# Patient Record
Sex: MALE | URBAN - METROPOLITAN AREA
[De-identification: names, ages, dates, MRNs, and addresses within clinical notes are randomized per-mention and may not be internally consistent; named-entity substitution may affect disease eponyms.]

---

## 2018-11-27 ENCOUNTER — APPOINTMENT (OUTPATIENT)
Dept: RADIOLOGY | Facility: IMAGING CENTER | Age: 33
End: 2018-11-27
Attending: PHYSICIAN ASSISTANT
Payer: COMMERCIAL

## 2018-11-27 ENCOUNTER — OFFICE VISIT (OUTPATIENT)
Dept: URGENT CARE | Facility: PHYSICIAN GROUP | Age: 33
End: 2018-11-27
Payer: COMMERCIAL

## 2018-11-27 VITALS
BODY MASS INDEX: 31.04 KG/M2 | OXYGEN SATURATION: 98 % | SYSTOLIC BLOOD PRESSURE: 132 MMHG | DIASTOLIC BLOOD PRESSURE: 76 MMHG | HEART RATE: 76 BPM | TEMPERATURE: 98 F | HEIGHT: 69 IN | RESPIRATION RATE: 16 BRPM | WEIGHT: 209.6 LBS

## 2018-11-27 DIAGNOSIS — S60.222A CONTUSION OF LEFT HAND, INITIAL ENCOUNTER: ICD-10-CM

## 2018-11-27 PROCEDURE — 99214 OFFICE O/P EST MOD 30 MIN: CPT | Performed by: PHYSICIAN ASSISTANT

## 2018-11-27 PROCEDURE — 73130 X-RAY EXAM OF HAND: CPT | Mod: 26,LT | Performed by: PHYSICIAN ASSISTANT

## 2018-11-27 RX ORDER — IBUPROFEN 800 MG/1
800 TABLET ORAL EVERY 8 HOURS PRN
Qty: 30 TAB | Refills: 0 | Status: SHIPPED | OUTPATIENT
Start: 2018-11-27 | End: 2022-01-26
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
## 2021-04-05 ENCOUNTER — APPOINTMENT (OUTPATIENT)
Dept: RADIOLOGY | Facility: IMAGING CENTER | Age: 36
End: 2021-04-05
Attending: PHYSICIAN ASSISTANT
Payer: COMMERCIAL

## 2021-04-05 ENCOUNTER — OFFICE VISIT (OUTPATIENT)
Dept: URGENT CARE | Facility: PHYSICIAN GROUP | Age: 36
End: 2021-04-05
Payer: COMMERCIAL

## 2021-04-05 ENCOUNTER — APPOINTMENT (OUTPATIENT)
Dept: URGENT CARE | Facility: PHYSICIAN GROUP | Age: 36
End: 2021-04-05
Payer: COMMERCIAL

## 2021-04-05 VITALS
WEIGHT: 220 LBS | OXYGEN SATURATION: 98 % | BODY MASS INDEX: 29.8 KG/M2 | TEMPERATURE: 99.5 F | SYSTOLIC BLOOD PRESSURE: 116 MMHG | RESPIRATION RATE: 12 BRPM | HEIGHT: 72 IN | HEART RATE: 72 BPM | DIASTOLIC BLOOD PRESSURE: 62 MMHG

## 2021-04-05 DIAGNOSIS — S91.031A PUNCTURE WOUND OF RIGHT ANKLE, INITIAL ENCOUNTER: ICD-10-CM

## 2021-04-05 PROCEDURE — 90471 IMMUNIZATION ADMIN: CPT | Performed by: PHYSICIAN ASSISTANT

## 2021-04-05 PROCEDURE — 90715 TDAP VACCINE 7 YRS/> IM: CPT | Performed by: PHYSICIAN ASSISTANT

## 2021-04-05 PROCEDURE — 99214 OFFICE O/P EST MOD 30 MIN: CPT | Mod: 25 | Performed by: PHYSICIAN ASSISTANT

## 2021-04-05 RX ORDER — CEPHALEXIN 500 MG/1
500 CAPSULE ORAL 3 TIMES DAILY
Qty: 30 CAPSULE | Refills: 0 | Status: SHIPPED | OUTPATIENT
Start: 2021-04-05 | End: 2021-04-15

## 2021-04-05 RX ORDER — CEPHALEXIN 500 MG/1
500 CAPSULE ORAL 3 TIMES DAILY
Qty: 30 CAPSULE | Refills: 0 | Status: SHIPPED | OUTPATIENT
Start: 2021-04-05 | End: 2021-04-05 | Stop reason: SDUPTHER

## 2021-04-05 NOTE — LETTER
April 5, 2021         Patient: Shant Jones   YOB: 1985   Date of Visit: 4/5/2021           To Whom it May Concern:    Shant Jones was seen in my clinic on 4/5/2021. He may return to work on 4/8/2021.  Please excuse any recent absences.    If you have any questions or concerns, please don't hesitate to call.        Sincerely,           Manuel Martinez P.A.-C.  Electronically Signed

## 2021-04-06 ENCOUNTER — TELEPHONE (OUTPATIENT)
Dept: URGENT CARE | Facility: PHYSICIAN GROUP | Age: 36
End: 2021-04-06

## 2021-04-06 NOTE — TELEPHONE ENCOUNTER
----- Message from Manuel Martinez P.A.-C. sent at 4/5/2021  7:15 PM PDT -----  Please notify patient ankle xray was normal.  No fracture or concerning findings

## 2021-04-06 NOTE — PROGRESS NOTES
Chief Complaint   Patient presents with   • Ankle Pain     R ankle puncture, earlier today        HISTORY OF PRESENT ILLNESS: Patient is a 35 y.o. male who presents today because a couple hours ago he had a piece of metal punctured the medial aspect of his right ankle and he has had gradually worsening pain and swelling since.  Denies any distal paresthesias.  He has not been taking any ibuprofen for it, he has not been icing it.  He was advised that we do not have x-ray at this facility.    There are no problems to display for this patient.      Allergies:Patient has no known allergies.    Current Outpatient Medications Ordered in Epic   Medication Sig Dispense Refill   • cephALEXin (KEFLEX) 500 MG Cap Take 1 capsule by mouth 3 times a day for 10 days. 30 capsule 0   • ibuprofen (MOTRIN) 800 MG Tab Take 1 Tab by mouth every 8 hours as needed for Mild Pain or Moderate Pain. (Patient not taking: Reported on 4/5/2021) 30 Tab 0   • hydrocodone-acetaminophen (NORCO) 5-325 MG Tab per tablet Take 1 Tab by mouth every 6 hours as needed. (Patient not taking: Reported on 11/27/2018) 20 Tab 0     No current Epic-ordered facility-administered medications on file.       History reviewed. No pertinent past medical history.    Social History     Tobacco Use   • Smoking status: Not on file   Substance Use Topics   • Alcohol use: Not on file   • Drug use: Not on file       No family status information on file.   History reviewed. No pertinent family history.    ROS:  Review of Systems   Constitutional: Negative for fever, chills, weight loss and malaise/fatigue.   HENT: Negative for ear pain, nosebleeds, congestion, sore throat and neck pain.    Eyes: Negative for blurred vision.   Respiratory: Negative for cough, sputum production, shortness of breath and wheezing.    Cardiovascular: Negative for chest pain, palpitations, orthopnea and leg swelling.   Gastrointestinal: Negative for heartburn, nausea, vomiting and abdominal pain.    Genitourinary: Negative for dysuria, urgency and frequency.     Exam:  /62   Pulse 72   Temp 37.5 °C (99.5 °F) (Temporal)   Resp 12   Ht 1.829 m (6')   Wt 99.8 kg (220 lb)   SpO2 98%   General:  Well nourished, well developed male in NAD  Head:Normocephalic, atraumatic  Eyes: PERRLA, EOM within normal limits, no conjunctival injection, no scleral icterus, visual fields and acuity grossly intact.  Nose: Symmetrical without tenderness, no discharge.  Mouth: reasonable hygiene, no erythema exudates or tonsillar enlargement.  Neck: no masses, range of motion within normal limits, no tracheal deviation. No obvious thyroid enlargement.  Extremities: Right medial ankle has a puncture wound over the malleolus with surrounding tenderness and swelling, no erythema.  He has reduced range of motion distally secondary to pain    Please note that this dictation was created using voice recognition software. I have made every reasonable attempt to correct obvious errors, but I expect that there are errors of grammar and possibly content that I did not discover before finalizing the note.    Assessment/Plan:  1. Puncture wound of right ankle, initial encounter  Tdap =>6yo IM    cephALEXin (KEFLEX) 500 MG Cap    DX-ANKLE 3+ VIEWS RIGHT   Wound was cleaned and dressed in the office, wound care instructions given, I instructed him to elevate, ice, take ibuprofen    Followup with primary care in the next 7-10 days if not significantly improving, return to the urgent care or go to the emergency room sooner for any worsening of symptoms.

## 2021-10-08 ENCOUNTER — OFFICE VISIT (OUTPATIENT)
Dept: URGENT CARE | Facility: PHYSICIAN GROUP | Age: 36
End: 2021-10-08

## 2021-10-08 VITALS
BODY MASS INDEX: 31.02 KG/M2 | HEART RATE: 98 BPM | HEIGHT: 72 IN | WEIGHT: 229 LBS | TEMPERATURE: 97.3 F | DIASTOLIC BLOOD PRESSURE: 94 MMHG | RESPIRATION RATE: 16 BRPM | SYSTOLIC BLOOD PRESSURE: 116 MMHG | OXYGEN SATURATION: 96 %

## 2021-10-08 DIAGNOSIS — M54.41 ACUTE BILATERAL LOW BACK PAIN WITH BILATERAL SCIATICA: ICD-10-CM

## 2021-10-08 DIAGNOSIS — M54.42 ACUTE BILATERAL LOW BACK PAIN WITH BILATERAL SCIATICA: ICD-10-CM

## 2021-10-08 PROCEDURE — 99213 OFFICE O/P EST LOW 20 MIN: CPT | Performed by: NURSE PRACTITIONER

## 2021-10-08 RX ORDER — CYCLOBENZAPRINE HCL 5 MG
5-10 TABLET ORAL 3 TIMES DAILY PRN
Qty: 30 TABLET | Refills: 0 | Status: SHIPPED | OUTPATIENT
Start: 2021-10-08

## 2021-10-08 RX ORDER — METHYLPREDNISOLONE 4 MG/1
TABLET ORAL
Qty: 21 TABLET | Refills: 0 | Status: SHIPPED | OUTPATIENT
Start: 2021-10-08

## 2021-10-08 ASSESSMENT — ENCOUNTER SYMPTOMS
FALLS: 0
FOCAL WEAKNESS: 0
DIZZINESS: 0
NAUSEA: 0
FLANK PAIN: 0
TINGLING: 0
FEVER: 0
VOMITING: 0
CHILLS: 0
DIARRHEA: 0
SENSORY CHANGE: 0
ORTHOPNEA: 0
WEAKNESS: 0
CONSTIPATION: 0
SHORTNESS OF BREATH: 0
BACK PAIN: 1
MYALGIAS: 1
PALPITATIONS: 0
ABDOMINAL PAIN: 0
HEADACHES: 0

## 2021-10-08 ASSESSMENT — PAIN SCALES - GENERAL: PAINLEVEL: 7=MODERATE-SEVERE PAIN

## 2021-10-08 NOTE — PROGRESS NOTES
Subjective     Shant Jones is a 36 y.o. male who presents with Low Back Pain (Radiating down both legs, x2-3days )            HPI  Lifting trailer, experiencing low back pain on and off x 3 days with all back movements. Heat/ice application, taking Ibuprofen 800 mg, none yesterday or today. Bilateral sciatica. No previous back injuries.     PMH:  has no past medical history on file.  MEDS:   Current Outpatient Medications:   •  ibuprofen (MOTRIN) 800 MG Tab, Take 1 Tab by mouth every 8 hours as needed for Mild Pain or Moderate Pain. (Patient not taking: Reported on 4/5/2021), Disp: 30 Tab, Rfl: 0  •  hydrocodone-acetaminophen (NORCO) 5-325 MG Tab per tablet, Take 1 Tab by mouth every 6 hours as needed. (Patient not taking: Reported on 11/27/2018), Disp: 20 Tab, Rfl: 0  ALLERGIES: No Known Allergies  SURGHX: History reviewed. No pertinent surgical history.  SOCHX:  reports that he has never smoked. He has never used smokeless tobacco. He reports previous alcohol use. He reports previous drug use.  FH: Family history was reviewed, no pertinent findings to report    Review of Systems   Constitutional: Negative for chills, fever and malaise/fatigue.   Respiratory: Negative for shortness of breath.    Cardiovascular: Negative for chest pain, palpitations and orthopnea.   Gastrointestinal: Negative for abdominal pain, constipation, diarrhea, nausea and vomiting.   Genitourinary: Negative for dysuria, flank pain, frequency, hematuria and urgency.   Musculoskeletal: Positive for back pain and myalgias. Negative for falls and joint pain.   Skin: Negative for itching and rash.   Neurological: Negative for dizziness, tingling, sensory change, focal weakness, weakness and headaches.   All other systems reviewed and are negative.             Objective     /94   Pulse 98   Temp 36.3 °C (97.3 °F) (Temporal)   Resp 16   Ht 1.829 m (6')   Wt 104 kg (229 lb)   SpO2 96%   BMI 31.06 kg/m²      Physical Exam  Vitals  reviewed.   Constitutional:       General: He is awake. He is not in acute distress.     Appearance: Normal appearance. He is well-developed. He is not ill-appearing, toxic-appearing or diaphoretic.   HENT:      Head: Normocephalic.   Eyes:      Pupils: Pupils are equal, round, and reactive to light.   Cardiovascular:      Rate and Rhythm: Normal rate.   Pulmonary:      Effort: Pulmonary effort is normal.   Musculoskeletal:      Lumbar back: Spasms and tenderness present. No swelling, edema, deformity, signs of trauma, lacerations or bony tenderness. Decreased range of motion. Positive right straight leg raise test and positive left straight leg raise test. No scoliosis.   Skin:     General: Skin is warm and dry.      Findings: No erythema or rash.   Neurological:      Mental Status: He is alert and oriented to person, place, and time.      Motor: Motor function is intact.      Coordination: Coordination is intact.      Gait: Gait is intact.   Psychiatric:         Attention and Perception: Attention normal.         Mood and Affect: Mood normal.         Speech: Speech normal.         Behavior: Behavior normal. Behavior is cooperative.                             Assessment & Plan        1. Acute bilateral low back pain with bilateral sciatica    - methylPREDNISolone (MEDROL DOSEPAK) 4 MG Tablet Therapy Pack; Follow schedule on package instructions.  Dispense: 21 Tablet; Refill: 0  - cyclobenzaprine (FLEXERIL) 5 mg tablet; Take 1-2 Tablets by mouth 3 times a day as needed for Muscle Spasms. Causes drowsiness, do not drive or work while using.  Dispense: 30 Tablet; Refill: 0     -Take over the counter NSAID as needed for back discomfort, avoid any Ibuprofen products with oral steroid use, may use Tylenol as needed for additional pain relief  -May use cool compresses for any swelling /throbbing pain and warm compresses for muscle stiffness   -May perform gentle back muscle stretches as tolerated after warm compresses to  maintain mobility, avoid abdominal crunches, heavy lifting or repetitive motions  -May use Flexeril as directed when at home only due to drowsiness  -May apply topical analgesics as needed (preferred lidocaine based topical like salon Pas)  -Perform proper body mechanics with lifting, twisting, bending and walking. Ask for assistance with heavy objects as needed   -Monitor for bowel/urination problems, numbness/tingling in extremities, decreased range of rodolfo with ambulation difficulty- need re-evaluation

## 2021-10-08 NOTE — LETTER
October 8, 2021       Patient: Shant Jones   YOB: 1985   Date of Visit: 10/8/2021         To Whom It May Concern:    In my medical opinion, I recommend that Shant Jones be excused from work today due to non-contagious illness.  If you have any questions or concerns, please don't hesitate to call [phone]          Sincerely,          WILLIAM DennisRDoreneN.  Electronically Signed

## 2022-01-26 ENCOUNTER — OFFICE VISIT (OUTPATIENT)
Dept: URGENT CARE | Facility: PHYSICIAN GROUP | Age: 37
End: 2022-01-26

## 2022-01-26 VITALS
TEMPERATURE: 98.6 F | WEIGHT: 235 LBS | RESPIRATION RATE: 18 BRPM | SYSTOLIC BLOOD PRESSURE: 118 MMHG | OXYGEN SATURATION: 96 % | DIASTOLIC BLOOD PRESSURE: 70 MMHG | HEIGHT: 72 IN | HEART RATE: 89 BPM | BODY MASS INDEX: 31.83 KG/M2

## 2022-01-26 DIAGNOSIS — F41.9 ANXIETY: ICD-10-CM

## 2022-01-26 DIAGNOSIS — R06.02 SHORTNESS OF BREATH: ICD-10-CM

## 2022-01-26 PROCEDURE — 99213 OFFICE O/P EST LOW 20 MIN: CPT | Performed by: NURSE PRACTITIONER

## 2022-01-26 ASSESSMENT — ENCOUNTER SYMPTOMS
CHILLS: 0
ORTHOPNEA: 0
SPUTUM PRODUCTION: 0
SORE THROAT: 0
DIAPHORESIS: 0
FEVER: 0
PALPITATIONS: 0
COUGH: 0
SHORTNESS OF BREATH: 1
NERVOUS/ANXIOUS: 1
WHEEZING: 0
HEMOPTYSIS: 0

## 2022-01-26 NOTE — PROGRESS NOTES
Subjective     Shant Jones is a 36 y.o. male who presents with Shortness of Breath (started last night, states maybe panic attack or anxiety, states started smoking again )            Shant reports that he has noted intermittent sensation of shortness of breath and anxiety since last night.  He denies any URI symptoms, fever, chills, myalgias or exposure to covid.  He recently started social smoking, having smoked less than 1 pack per month for the past 6 months.  He consumes caffeine regularly and late into the day.  He denies any drug use.  No history of anxiety or panic attacks.  NO recent life change or acute stress.  No family history of MI at young age or sudden unexplained death.  Not currently symptomatic.        Review of Systems   Constitutional: Negative for chills, diaphoresis, fever and malaise/fatigue.   HENT: Negative for congestion, ear pain and sore throat.    Respiratory: Positive for shortness of breath. Negative for cough, hemoptysis, sputum production and wheezing.    Cardiovascular: Negative for chest pain, palpitations, orthopnea and leg swelling.   Psychiatric/Behavioral: The patient is nervous/anxious.      Medications, Allergies, and current problem list reviewed today in Epic         Objective     Blood Pressure 118/70 (BP Location: Right arm, Patient Position: Sitting, BP Cuff Size: Adult)   Pulse 89   Temperature 37 °C (98.6 °F) (Temporal)   Respiration 18   Height 1.829 m (6')   Weight 107 kg (235 lb)   Oxygen Saturation 96%   Body Mass Index 31.87 kg/m²      Physical Exam  Vitals reviewed.   Constitutional:       General: He is not in acute distress.     Appearance: Normal appearance. He is well-developed. He is not ill-appearing, toxic-appearing or diaphoretic.   HENT:      Right Ear: Tympanic membrane, ear canal and external ear normal. There is no impacted cerumen.      Left Ear: Tympanic membrane, ear canal and external ear normal. There is no impacted cerumen.       Nose: Nose normal.      Mouth/Throat:      Mouth: Mucous membranes are moist.      Pharynx: No oropharyngeal exudate or posterior oropharyngeal erythema.   Eyes:      General: No scleral icterus.        Right eye: No discharge.         Left eye: No discharge.      Conjunctiva/sclera: Conjunctivae normal.      Pupils: Pupils are equal, round, and reactive to light.   Neck:      Thyroid: No thyromegaly.      Vascular: No JVD.      Trachea: No tracheal deviation.   Cardiovascular:      Rate and Rhythm: Normal rate and regular rhythm.      Heart sounds: Normal heart sounds. No murmur heard.  No friction rub. No gallop.    Pulmonary:      Effort: Pulmonary effort is normal. No respiratory distress.      Breath sounds: Normal breath sounds. No stridor. No wheezing, rhonchi or rales.   Chest:      Chest wall: No tenderness.   Musculoskeletal:      Cervical back: Neck supple. No tenderness.   Lymphadenopathy:      Cervical: No cervical adenopathy.   Skin:     General: Skin is warm and dry.      Coloration: Skin is not jaundiced or pale.      Findings: No erythema.   Neurological:      Mental Status: He is alert and oriented to person, place, and time.   Psychiatric:         Attention and Perception: Attention and perception normal.         Mood and Affect: Mood is anxious.         Speech: Speech normal.         Behavior: Behavior normal.         Thought Content: Thought content normal.                             Assessment & Plan        1. Shortness of breath     2. Anxiety       Discussed exam findings with Shant.  Etiology unclear.  Differential reviewed.  Reduce caffeine to less than 3 cups coffee per day and none later than noon.  Stop smoking.  Maintain adequate hydration.  Eat healthy diet and exercise regularly with cardiovascular activity.  Follow up in 1 week for persistent symptoms, sooner if worse.  ED precautions reviewed. Establish with a PCP.  He verbalized understanding of and agreed with plan of care.

## 2022-04-27 ENCOUNTER — APPOINTMENT (OUTPATIENT)
Dept: URGENT CARE | Facility: PHYSICIAN GROUP | Age: 37
End: 2022-04-27

## 2022-09-13 ENCOUNTER — NON-PROVIDER VISIT (OUTPATIENT)
Dept: URGENT CARE | Facility: CLINIC | Age: 37
End: 2022-09-13

## 2022-09-13 DIAGNOSIS — Z02.1 PRE-EMPLOYMENT DRUG SCREENING: ICD-10-CM

## 2022-09-13 LAB
AMP AMPHETAMINE: NORMAL
COC COCAINE: NORMAL
INT CON NEG: NORMAL
INT CON POS: NORMAL
MET METHAMPHETAMINES: NORMAL
OPI OPIATES: NORMAL
PCP PHENCYCLIDINE: NORMAL
POC DRUG COMMENT 753798-OCCUPATIONAL HEALTH: NEGATIVE
THC: NORMAL

## 2022-09-13 PROCEDURE — 80305 DRUG TEST PRSMV DIR OPT OBS: CPT | Performed by: FAMILY MEDICINE

## 2022-10-07 ENCOUNTER — NON-PROVIDER VISIT (OUTPATIENT)
Dept: URGENT CARE | Facility: CLINIC | Age: 37
End: 2022-10-07

## 2022-10-07 DIAGNOSIS — Z02.1 PRE-EMPLOYMENT DRUG SCREENING: ICD-10-CM

## 2022-10-07 LAB
AMP AMPHETAMINE: NORMAL
BAR BARBITURATES: NORMAL
BZO BENZODIAZEPINES: NORMAL
COC COCAINE: NORMAL
INT CON NEG: NORMAL
INT CON POS: NORMAL
MDMA ECSTASY: NORMAL
MET METHAMPHETAMINES: NORMAL
MTD METHADONE: NORMAL
OPI OPIATES: NORMAL
OXY OXYCODONE: NORMAL
PCP PHENCYCLIDINE: NORMAL
POC URINE DRUG SCREEN OCDRS: NEGATIVE
THC: NORMAL

## 2022-10-07 PROCEDURE — 80305 DRUG TEST PRSMV DIR OPT OBS: CPT | Performed by: FAMILY MEDICINE
